# Patient Record
Sex: MALE | Race: WHITE | NOT HISPANIC OR LATINO | URBAN - METROPOLITAN AREA
[De-identification: names, ages, dates, MRNs, and addresses within clinical notes are randomized per-mention and may not be internally consistent; named-entity substitution may affect disease eponyms.]

---

## 2017-08-31 ENCOUNTER — OFFICE VISIT - HEALTHEAST (OUTPATIENT)
Dept: FAMILY MEDICINE | Facility: CLINIC | Age: 42
End: 2017-08-31

## 2017-08-31 DIAGNOSIS — M25.561 ACUTE PAIN OF RIGHT KNEE: ICD-10-CM

## 2017-08-31 DIAGNOSIS — Z00.00 ANNUAL PHYSICAL EXAM: ICD-10-CM

## 2017-08-31 DIAGNOSIS — G47.00 INSOMNIA, UNSPECIFIED TYPE: ICD-10-CM

## 2017-08-31 DIAGNOSIS — L81.9 PIGMENTATION ABNORMALITY OF SKIN: ICD-10-CM

## 2017-08-31 DIAGNOSIS — R06.83 SNORING: ICD-10-CM

## 2017-08-31 DIAGNOSIS — E66.9 OBESITY: ICD-10-CM

## 2017-08-31 DIAGNOSIS — R61 HYPERHIDROSIS: ICD-10-CM

## 2017-08-31 DIAGNOSIS — M77.9 BONE SPUR: ICD-10-CM

## 2017-08-31 ASSESSMENT — MIFFLIN-ST. JEOR: SCORE: 1938.24

## 2017-09-01 LAB
CHOLEST SERPL-MCNC: 232 MG/DL
FASTING STATUS PATIENT QL REPORTED: NO
HDLC SERPL-MCNC: 39 MG/DL
HIV 1+2 AB+HIV1 P24 AG SERPL QL IA: NEGATIVE
LDLC SERPL CALC-MCNC: 138 MG/DL
TRIGL SERPL-MCNC: 274 MG/DL

## 2017-09-02 LAB — SYPHILIS RPR SCREEN - HISTORICAL: NORMAL

## 2017-09-25 ENCOUNTER — COMMUNICATION - HEALTHEAST (OUTPATIENT)
Dept: ADMINISTRATIVE | Facility: CLINIC | Age: 42
End: 2017-09-25

## 2017-10-03 ENCOUNTER — RECORDS - HEALTHEAST (OUTPATIENT)
Dept: ADMINISTRATIVE | Facility: OTHER | Age: 42
End: 2017-10-03

## 2017-10-10 ENCOUNTER — RECORDS - HEALTHEAST (OUTPATIENT)
Dept: ADMINISTRATIVE | Facility: OTHER | Age: 42
End: 2017-10-10

## 2017-10-19 ENCOUNTER — OFFICE VISIT - HEALTHEAST (OUTPATIENT)
Dept: SLEEP MEDICINE | Facility: CLINIC | Age: 42
End: 2017-10-19

## 2017-10-19 DIAGNOSIS — R06.83 SNORING: ICD-10-CM

## 2017-10-19 DIAGNOSIS — R09.81 NASAL CONGESTION: ICD-10-CM

## 2017-10-19 ASSESSMENT — MIFFLIN-ST. JEOR: SCORE: 1942.67

## 2017-11-09 ENCOUNTER — RECORDS - HEALTHEAST (OUTPATIENT)
Dept: ADMINISTRATIVE | Facility: OTHER | Age: 42
End: 2017-11-09

## 2021-05-31 VITALS — BODY MASS INDEX: 33.5 KG/M2 | HEIGHT: 70 IN | WEIGHT: 234 LBS

## 2021-05-31 VITALS — WEIGHT: 233.9 LBS | BODY MASS INDEX: 33.49 KG/M2 | HEIGHT: 70 IN

## 2021-06-12 NOTE — PROGRESS NOTES
Assessment:     1. Annual physical exam  Lipid [place] RANDOM    HIV Antigen/Antibody Screening [place]    Syphilis Screen, [place]    Chlamydia trachomatis & Neisseria gonorrhoeae, Amplified Detection    Comprehensive Metabolic Panel   2. Acute pain of right knee  Ambulatory referral to Orthopedic Surgery   3. Snoring  Ambulatory referral to Sleep Medicine   4. Pigmentation abnormality of skin  Ambulatory referral to Dermatology   5. Insomnia, unspecified type     6. Bone spur  Ambulatory referral to Orthopedic Surgery   7. Hyperhidrosis  Ambulatory referral to Dermatology   8. Obesity          Healthy male exam.      Plan:       All questions answered.  Blood tests: Lipid panel and STD testing.  Discussed healthy lifestyle modifications.  Follow up as needed.   Will notify patient of any grossly abnormal results.   Subjective:      Albin Peterson is a 42 y.o. male who presents for an annual exam. The patient reports that there is not domestic violence in his life.  He has not seen a physician in over 6 years.  He has multiple concerns today.  We reviewed his family history, medical history and problems list today.  He does drink roughly 12 alcoholic beverages per week, he does smoke marijuana on a regular basis, however, he does not smoke cigarettes or chew tobacco.  Patient works full-time Monday through Friday as a  in [place].  He does have a girlfriend named Yennifer, he has no children.  His mother is  from lung cancer and his father is living.  He has no siblings.  When reviewing his diet, he tells me that his diet is poor.  He typically skips breakfast, he will eat a light lunch and has a large dinner that typical includes pasta or pizza.  He does not exercise regularly, he does participate in softball and golfing during the summer.  Problems list includes hyperhidrosis and pigmentation changes of skin, headaches typically one migraine per month, sensitivity to light, patient has  not been in to see an eye doctor for several years.  I recommend that he make an appointment to have his eyes checked.  He does have poor exercise tolerance and experiences low back pain with restless legs at night. He did experience some sciatica symptoms in 2006, but, has not had problems like this since. He has never had surgical intervention for his back issues.  He has had ongoing right knee pain for the last year that becomes aggravated when going up and down steps stairs and with squatting.  He has a notable sized cyst versus bone spur on the outer portion of his left knee that has been present for several years.  It has grown in size and he would like to see ortho about having this removed.  He does suffer from seasonal allergies and takes Zyrtec for this.  For the last several months, his girlfriend has been informing him about his loud snoring and periods of apnea when he sleeps.  He is requesting a referral to have a sleep study performed today.  We did discuss his body mass index, patient is aware that he has weight he needs to lose.  He does admit to poor sleep and feeling fatigues upon waking in the morning. He did wear a Fitbit to bed one night order to track his sleep, his Fitbit said he was restless over 100 times.     Healthy Habits:   Regular Exercise: No, discussed  Sunscreen Use: Yes  Healthy Diet: No, discussed  Dental Visits Regularly: No, discussed, urged patient to make an appointment  Seat Belt: Yes  Sexually active: Yes  Monthly Self Testicular Exams:  No, discussed  Hemoccults: No  Flex Sig: No  Colonoscopy: No  Lipid Profile: Yes  Glucose Screen: Yes  Prevention of Osteoporosis: No  Last Dexa: No  Guns at Home:  No      Immunization History   Administered Date(s) Administered     Td, historic 02/16/2011     Tdap 02/16/2011     Immunization status: up to date and documented.    No exam data present     No current outpatient prescriptions on file.     No current facility-administered  "medications for this visit.      Past Medical History:   Diagnosis Date     Back pain      Headache      Hyperhidrosis      Knee joint cyst, left      Knee pain, right      Snoring      Past Surgical History:   Procedure Laterality Date     WISDOM TOOTH EXTRACTION       Sulfamethoxazole-trimethoprim  Family History   Problem Relation Age of Onset     Cancer Mother      Social History     Social History     Marital status: Single     Spouse name: N/A     Number of children: N/A     Years of education: 14     Occupational History           [employer]     Social History Main Topics     Smoking status: Never Smoker     Smokeless tobacco: Never Used     Alcohol use Yes      Comment: 10     Drug use: Yes     Special: Marijuana     Sexual activity: Yes     Partners: Female     Other Topics Concern     Not on file     Social History Narrative       Review of Systems  Review of Systems      Denies trauma, locking, clicking, giving away, fevers, chills, sweating, rash or warmth.      Objective:     Vitals:    08/31/17 1552   BP: 126/86   Pulse: 64   Resp: 14   Temp: 97.8  F (36.6  C)   TempSrc: Oral   Weight: (!) 233 lb 14.4 oz (106.1 kg)   Height: 5' 9.75\" (1.772 m)   PainSc:   4   PainLoc: Knee     Body mass index is 33.8 kg/(m^2).    Physical  Physical Exam           Objective:         /86 (Patient Site: Right Arm, Patient Position: Sitting, Cuff Size: Adult Large)  Pulse 64  Temp 97.8  F (36.6  C) (Oral)   Resp 14 Comment: regular  Ht 5' 9.75\" (1.772 m)  Wt (!) 233 lb 14.4 oz (106.1 kg)  BMI 33.8 kg/m2     Physical Exam:  General Appearance: Alert, cooperative, no distress, appears stated age  Head: Normocephalic, without obvious abnormality, atraumatic  Eyes: PERRL, conjunctiva/corneas clear, EOM's intact  Ears: Normal TM's and external ear canals, both ears  Nose: Nares normal, septum midline,mucosa normal, no drainage  Throat: Lips, mucosa, and tongue normal; teeth and gums " normal  Neck: Supple, symmetrical, trachea midline, no adenopathy;  thyroid: not enlarged, symmetric, no tenderness/mass/nodules; no carotid bruit or JVD  Back: Symmetric, no curvature, ROM normal, no CVA tenderness  Lungs: Clear to auscultation bilaterally, respirations unlabored  Heart: Regular rate and rhythm, S1 and S2 normal, no murmur, rub, or gallop  Abdomen: Soft, non-tender, bowel sounds active all four quadrants,  no masses, no organomegaly  Genital: penis and scrotum normal, no hernia, no ulcerations  Extremities: growth on outer left knee, firm nodule, no fluid filled cyst, no cyst on posterior knee, crepitus palpated on right knee with flexion and extension  Skin: Skin color, texture, turgor normal, no rashes or lesions, many moles, areas where skin appears mildly erythematous  Lymph nodes: Cervical, supraclavicular, and axillary nodes normal  Neurologic: Normal, DTR's intact, no drift, face symmetrical, negative Romberg     40 minutes spent together with the patient today, more than 50% spent in counseling, discussing the above topics.    Alyssia Conway NP

## 2021-06-13 NOTE — PROGRESS NOTES
Dear  Alyssia Conway, Np  [address] [facility]  [facility]  [address]  [place] [address]    Thank you for the opportunity to participate in the care of  Albin Peterson.    He is a 42 y.o. y/o who comes to the clinic due to snoring.     The patient reports snoring that started more than 20 years ago. The snoring is very loud.  The patient has a  bed partner that confirms the snoring. The snoring happens on most nights .  He thinks that the snoring has been getting worse over time. He has been told that he stops breathing during his sleep.     Associated symptoms:  Morning Headache:no  Dry mouth: yes.  Witnessed apneas:yes  Daytime sleepiness:no    He has been told that he talks during his sleep. He was not aware of this problem until he filled our screening questionnaire. He does not know how often this happens.     Epworths Sleepiness Scale 10/19/2017   Sitting and reading 2   Watching TV 1   Sitting, inactive in a public place (e.g. a theatre or a meeting) 0   As a passenger in a car for an hour without a break 1   Lying down to rest in the afternoon when circumstances permit 3   Sitting and talking to someone 0   Sitting quietly after a lunch without alcohol 0   In a car, while stopped for a few minutes in traffic 0   Total score 7   Rooming 10/19/2017   Usual bedtime 11-12   Sleep Latency 5 10min   Awakenings 2-4   Wake Up Time 6am   Weekends varies   Energy Drinks ocassionally   Coffee 2-3qd   Cola 0   Difficulty falling asleep No   Difficulty staying asleep Yes   Excessive daytime tiredness No   Excessive daytime sleepiness No   Dozing off while driving No   Shift Worker No   Sleep Walking? No   Sleep Talking? Yes   Kicking or punching? No   Restless legs symptoms No   STOP BANG 10/19/2017   Do you snore loudly (louder than talking or loud enough to be heard through closed doors)? 1   Do you often feel tired, fatigued, or sleepy during daytime? 0   Has anyone observed you stop breathing in your sleep? 1   Do  you have or are you being treated for high blood pressure? 0   BMI more than 35 kg/m2 0   Age over 50 years old? 0   Neck circumference greater than 16 inches? 1   Gender male? 1   Total Score 4       Past Medical History  Past Medical History:   Diagnosis Date     Back pain      Headache      Hyperhidrosis      Knee joint cyst, left      Knee pain, right      Snoring         Past Surgical History  Past Surgical History:   Procedure Laterality Date     WISDOM TOOTH EXTRACTION          Meds  No current outpatient prescriptions on file.     No current facility-administered medications for this visit.         Allergies  Sulfamethoxazole-trimethoprim     Social History  Social History     Social History     Marital status: Single     Spouse name: N/A     Number of children: N/A     Years of education: 14     Occupational History           [employer]     Social History Main Topics     Smoking status: Never Smoker     Smokeless tobacco: Never Used     Alcohol use Yes      Comment: 10     Drug use: Yes     Special: Marijuana     Sexual activity: Yes     Partners: Female     Other Topics Concern     Not on file     Social History Narrative        Family History  Family History   Problem Relation Age of Onset     Cancer Mother      Snoring Mother            Review of Systems:  Constitutional: Negative except as noted in HPI.   Eyes: Negative except as noted in HPI.   ENT: Negative except as noted in HPI.   Cardiovascular: Negative except as noted in HPI.   Respiratory: Negative except as noted in HPI.   Gastrointestinal: Negative except as noted in HPI.   Genitourinary: Negative except as noted in HPI.   Musculoskeletal: Negative except as noted in HPI.   Integumentary: Negative except as noted in HPI.   Neurological: Negative except as noted in HPI.   Psychiatric: Negative except as noted in HPI.   Endocrine: Negative except as noted in HPI.   Hematologic/Lymphatic: Negative except as noted in HPI.   "    Physical Exam:  /78  Pulse 71  Ht 5' 10\" (1.778 m)  Wt (!) 234 lb (106.1 kg)  SpO2 96%  BMI 33.58 kg/m2  BMI:Body mass index is 33.58 kg/(m^2).   GEN: NAD, obese  Head: Normocephalic.  EYES: PERRLA, EOMI  ENT: Oropharynx is clear, Mallampatti class IV airway. Uvula is edematous.  Tonsils are 3+  Nasal mucosa is red. Turbinates are edematous.  Neck : Thyroid is not palpable  CV: Regular rate and rhythm, S1 & S2 are normal. No murmurs  LUNGS: clear to auscultation bilaterally, no wheezes  ABDOMEN: positive bowel sounds, soft, no rebound or guarding  MUSCULOSKELETAL: no clubbing, cyanosis or edema  SKIN: warm, dry, no rashes  Neurological: Alert, oriented to time, place, and person.  Psych:  normal mood, normal affect     Labs/Studies:     Lab Results   Component Value Date    WBC 17.3 (H) 10/26/2011    HGB 14.5 10/26/2011    HCT 42.0 10/26/2011    MCV 91 10/26/2011     10/26/2011         Chemistry        Component Value Date/Time     08/31/2017 1644    K 4.0 08/31/2017 1644     08/31/2017 1644    CO2 22 08/31/2017 1644    BUN 12 08/31/2017 1644    CREATININE 0.97 08/31/2017 1644    GLU 78 08/31/2017 1644        Component Value Date/Time    CALCIUM 9.6 08/31/2017 1644    ALKPHOS 59 08/31/2017 1644    AST 31 08/31/2017 1644    ALT 69 (H) 08/31/2017 1644    BILITOT 0.5 08/31/2017 1644              Assessment and Plan:  In summary Albin Peterson is a 42 y.o. year old male here for consultation.    1. Snoring.  At this point he is not having any significant daytime symptoms.  We had an extensive conversation to discuss pursuing a MAD vs doing a PSG to stratify his risk first.  He would prefer to try a MAD first.    2- Nasal congestion.  This is most likely multifactorial.  He is having significant nasal obstruction and he would benefit from using Flonase.      Patient verbalized understanding of these issues, agrees with the plan and all questions were answered today. Patient was given an " opportuntity to voice any other symptoms or concerns not listed above. Patient did not have any other symptoms or concerns.     RTC as needed.     MD CLARISSA Sanchez Board Certified in Internal Medicine and Sleep Medicine  [facility].